# Patient Record
Sex: MALE | Race: WHITE | NOT HISPANIC OR LATINO | Employment: FULL TIME | URBAN - METROPOLITAN AREA
[De-identification: names, ages, dates, MRNs, and addresses within clinical notes are randomized per-mention and may not be internally consistent; named-entity substitution may affect disease eponyms.]

---

## 2020-03-11 ENCOUNTER — APPOINTMENT (OUTPATIENT)
Dept: GENERAL RADIOLOGY | Facility: HOSPITAL | Age: 50
End: 2020-03-11

## 2020-03-11 ENCOUNTER — HOSPITAL ENCOUNTER (EMERGENCY)
Facility: HOSPITAL | Age: 50
Discharge: HOME | End: 2020-03-11
Attending: EMERGENCY MEDICINE | Admitting: EMERGENCY MEDICINE

## 2020-03-11 VITALS
DIASTOLIC BLOOD PRESSURE: 85 MMHG | BODY MASS INDEX: 30.31 KG/M2 | HEIGHT: 68 IN | OXYGEN SATURATION: 100 % | SYSTOLIC BLOOD PRESSURE: 150 MMHG | RESPIRATION RATE: 20 BRPM | TEMPERATURE: 98.1 F | WEIGHT: 200 LBS | HEART RATE: 95 BPM

## 2020-03-11 DIAGNOSIS — K21.9 GASTROESOPHAGEAL REFLUX DISEASE WITHOUT ESOPHAGITIS: ICD-10-CM

## 2020-03-11 DIAGNOSIS — B34.9 VIRAL SYNDROME: Primary | ICD-10-CM

## 2020-03-11 LAB
ALBUMIN SERPL-MCNC: 4.9 G/DL (ref 3.5–5.2)
ALBUMIN/GLOB SERPL: 1.6 G/DL
ALP SERPL-CCNC: 101 U/L (ref 39–117)
ALT SERPL W P-5'-P-CCNC: 29 U/L (ref 1–41)
ANION GAP SERPL CALCULATED.3IONS-SCNC: 24 MMOL/L (ref 5–15)
AST SERPL-CCNC: 19 U/L (ref 1–40)
BASOPHILS # BLD AUTO: 0.05 10*3/MM3 (ref 0–0.2)
BASOPHILS NFR BLD AUTO: 0.4 % (ref 0–1.5)
BILIRUB SERPL-MCNC: 0.6 MG/DL (ref 0.2–1.2)
BUN BLD-MCNC: 22 MG/DL (ref 6–20)
BUN/CREAT SERPL: 24.4 (ref 7–25)
CALCIUM SPEC-SCNC: 10.2 MG/DL (ref 8.6–10.5)
CHLORIDE SERPL-SCNC: 97 MMOL/L (ref 98–107)
CO2 SERPL-SCNC: 14 MMOL/L (ref 22–29)
CREAT BLD-MCNC: 0.9 MG/DL (ref 0.76–1.27)
DEPRECATED RDW RBC AUTO: 38.3 FL (ref 37–54)
EOSINOPHIL # BLD AUTO: 0.03 10*3/MM3 (ref 0–0.4)
EOSINOPHIL NFR BLD AUTO: 0.3 % (ref 0.3–6.2)
ERYTHROCYTE [DISTWIDTH] IN BLOOD BY AUTOMATED COUNT: 11.8 % (ref 12.3–15.4)
FLUAV AG NPH QL: NEGATIVE
FLUBV AG NPH QL IA: NEGATIVE
GFR SERPL CREATININE-BSD FRML MDRD: 90 ML/MIN/1.73
GLOBULIN UR ELPH-MCNC: 3 GM/DL
GLUCOSE BLD-MCNC: 201 MG/DL (ref 65–99)
HCT VFR BLD AUTO: 42.9 % (ref 37.5–51)
HGB BLD-MCNC: 14.7 G/DL (ref 13–17.7)
HOLD SPECIMEN: NORMAL
HOLD SPECIMEN: NORMAL
IMM GRANULOCYTES # BLD AUTO: 0.06 10*3/MM3 (ref 0–0.05)
IMM GRANULOCYTES NFR BLD AUTO: 0.5 % (ref 0–0.5)
LIPASE SERPL-CCNC: 18 U/L (ref 13–60)
LYMPHOCYTES # BLD AUTO: 2.12 10*3/MM3 (ref 0.7–3.1)
LYMPHOCYTES NFR BLD AUTO: 18.5 % (ref 19.6–45.3)
MCH RBC QN AUTO: 30.8 PG (ref 26.6–33)
MCHC RBC AUTO-ENTMCNC: 34.3 G/DL (ref 31.5–35.7)
MCV RBC AUTO: 89.7 FL (ref 79–97)
MONOCYTES # BLD AUTO: 0.39 10*3/MM3 (ref 0.1–0.9)
MONOCYTES NFR BLD AUTO: 3.4 % (ref 5–12)
NEUTROPHILS # BLD AUTO: 8.81 10*3/MM3 (ref 1.7–7)
NEUTROPHILS NFR BLD AUTO: 76.9 % (ref 42.7–76)
NRBC BLD AUTO-RTO: 0 /100 WBC (ref 0–0.2)
NT-PROBNP SERPL-MCNC: 12.3 PG/ML (ref 5–450)
PLATELET # BLD AUTO: 285 10*3/MM3 (ref 140–450)
PMV BLD AUTO: 11 FL (ref 6–12)
POTASSIUM BLD-SCNC: 4.8 MMOL/L (ref 3.5–5.2)
PROT SERPL-MCNC: 7.9 G/DL (ref 6–8.5)
RBC # BLD AUTO: 4.78 10*6/MM3 (ref 4.14–5.8)
SODIUM BLD-SCNC: 135 MMOL/L (ref 136–145)
TROPONIN T SERPL-MCNC: <0.01 NG/ML (ref 0–0.03)
TROPONIN T SERPL-MCNC: <0.01 NG/ML (ref 0–0.03)
WBC NRBC COR # BLD: 11.46 10*3/MM3 (ref 3.4–10.8)
WHOLE BLOOD HOLD SPECIMEN: NORMAL
WHOLE BLOOD HOLD SPECIMEN: NORMAL

## 2020-03-11 PROCEDURE — 99285 EMERGENCY DEPT VISIT HI MDM: CPT

## 2020-03-11 PROCEDURE — 83690 ASSAY OF LIPASE: CPT | Performed by: EMERGENCY MEDICINE

## 2020-03-11 PROCEDURE — 71045 X-RAY EXAM CHEST 1 VIEW: CPT

## 2020-03-11 PROCEDURE — 83880 ASSAY OF NATRIURETIC PEPTIDE: CPT | Performed by: EMERGENCY MEDICINE

## 2020-03-11 PROCEDURE — 25010000002 ONDANSETRON PER 1 MG: Performed by: EMERGENCY MEDICINE

## 2020-03-11 PROCEDURE — 96374 THER/PROPH/DIAG INJ IV PUSH: CPT

## 2020-03-11 PROCEDURE — 25010000002 KETOROLAC TROMETHAMINE PER 15 MG: Performed by: PHYSICIAN ASSISTANT

## 2020-03-11 PROCEDURE — 85025 COMPLETE CBC W/AUTO DIFF WBC: CPT | Performed by: EMERGENCY MEDICINE

## 2020-03-11 PROCEDURE — 96375 TX/PRO/DX INJ NEW DRUG ADDON: CPT

## 2020-03-11 PROCEDURE — 93005 ELECTROCARDIOGRAM TRACING: CPT | Performed by: EMERGENCY MEDICINE

## 2020-03-11 PROCEDURE — 87804 INFLUENZA ASSAY W/OPTIC: CPT | Performed by: PHYSICIAN ASSISTANT

## 2020-03-11 PROCEDURE — 80053 COMPREHEN METABOLIC PANEL: CPT | Performed by: EMERGENCY MEDICINE

## 2020-03-11 PROCEDURE — 84484 ASSAY OF TROPONIN QUANT: CPT | Performed by: EMERGENCY MEDICINE

## 2020-03-11 RX ORDER — ONDANSETRON 4 MG/1
4 TABLET, ORALLY DISINTEGRATING ORAL EVERY 8 HOURS PRN
Qty: 12 TABLET | Refills: 0 | Status: SHIPPED | OUTPATIENT
Start: 2020-03-11

## 2020-03-11 RX ORDER — LIDOCAINE HYDROCHLORIDE 20 MG/ML
15 SOLUTION OROPHARYNGEAL ONCE
Status: COMPLETED | OUTPATIENT
Start: 2020-03-11 | End: 2020-03-11

## 2020-03-11 RX ORDER — INDOMETHACIN 25 MG/1
25 CAPSULE ORAL 3 TIMES DAILY PRN
COMMUNITY
End: 2020-03-11

## 2020-03-11 RX ORDER — FLUOXETINE 10 MG/1
10 CAPSULE ORAL DAILY
COMMUNITY

## 2020-03-11 RX ORDER — KETOROLAC TROMETHAMINE 30 MG/ML
30 INJECTION, SOLUTION INTRAMUSCULAR; INTRAVENOUS ONCE
Status: COMPLETED | OUTPATIENT
Start: 2020-03-11 | End: 2020-03-11

## 2020-03-11 RX ORDER — OMEPRAZOLE 20 MG/1
20 CAPSULE, DELAYED RELEASE ORAL DAILY
Qty: 14 CAPSULE | Refills: 0 | Status: SHIPPED | OUTPATIENT
Start: 2020-03-11

## 2020-03-11 RX ORDER — SUCRALFATE 1 G/1
1 TABLET ORAL 4 TIMES DAILY
Qty: 40 TABLET | Refills: 0 | Status: SHIPPED | OUTPATIENT
Start: 2020-03-11

## 2020-03-11 RX ORDER — SODIUM CHLORIDE 0.9 % (FLUSH) 0.9 %
10 SYRINGE (ML) INJECTION AS NEEDED
Status: DISCONTINUED | OUTPATIENT
Start: 2020-03-11 | End: 2020-03-11 | Stop reason: HOSPADM

## 2020-03-11 RX ORDER — ONDANSETRON 2 MG/ML
4 INJECTION INTRAMUSCULAR; INTRAVENOUS ONCE
Status: COMPLETED | OUTPATIENT
Start: 2020-03-11 | End: 2020-03-11

## 2020-03-11 RX ORDER — ALUMINA, MAGNESIA, AND SIMETHICONE 2400; 2400; 240 MG/30ML; MG/30ML; MG/30ML
15 SUSPENSION ORAL ONCE
Status: COMPLETED | OUTPATIENT
Start: 2020-03-11 | End: 2020-03-11

## 2020-03-11 RX ORDER — ASPIRIN 81 MG/1
324 TABLET, CHEWABLE ORAL ONCE
Status: COMPLETED | OUTPATIENT
Start: 2020-03-11 | End: 2020-03-11

## 2020-03-11 RX ORDER — LISINOPRIL 10 MG/1
10 TABLET ORAL DAILY
COMMUNITY

## 2020-03-11 RX ADMIN — LIDOCAINE HYDROCHLORIDE 15 ML: 20 SOLUTION ORAL; TOPICAL at 12:56

## 2020-03-11 RX ADMIN — KETOROLAC TROMETHAMINE 30 MG: 30 INJECTION, SOLUTION INTRAMUSCULAR; INTRAVENOUS at 12:54

## 2020-03-11 RX ADMIN — SODIUM CHLORIDE 1000 ML: 9 INJECTION, SOLUTION INTRAVENOUS at 10:11

## 2020-03-11 RX ADMIN — ONDANSETRON 4 MG: 2 INJECTION INTRAMUSCULAR; INTRAVENOUS at 12:53

## 2020-03-11 RX ADMIN — ASPIRIN 81 MG 324 MG: 81 TABLET ORAL at 10:12

## 2020-03-11 RX ADMIN — ALUMINUM HYDROXIDE, MAGNESIUM HYDROXIDE, AND DIMETHICONE 15 ML: 400; 400; 40 SUSPENSION ORAL at 12:56

## 2020-03-11 NOTE — ED PROVIDER NOTES
Subjective   Mr. Short is a 49-year-old male [facility] with multiple complaints.  He complains he is just generally not feeling well has had some nausea is having a lot of heartburn.  Patient reports still a bit of a cough and runny nose.  He has not traveled recently.  He has recently switched primary care doctors because he felt his blood sugars not being controlled.  Patient had no shortness of breath no chest pain associated with this.  Patient does smoke.  Patient has a mild headache.  Patient reports he had no diarrhea associated with this.  Has no abdominal pain associate with this other than the burning sensation in the epigastrium as discussed.      History provided by:  Patient and significant other   used: No    Vomiting   The primary symptoms include nausea, vomiting and myalgias. Primary symptoms do not include fever, fatigue, abdominal pain, melena, jaundice, hematochezia, dysuria, arthralgias or rash. The illness began 3 to 5 days ago. The onset was gradual.   The illness is also significant for chills. The illness does not include anorexia, dysphagia, bloating, constipation or itching. Significant associated medical issues include GERD and PUD. Associated medical issues do not include inflammatory bowel disease, gallstones, liver disease, alcohol abuse, gastric bypass, irritable bowel syndrome, hemorrhoids or diverticulitis.       Review of Systems   Constitutional: Positive for chills. Negative for fatigue and fever.   Respiratory: Negative for chest tightness, shortness of breath and wheezing.    Cardiovascular: Negative for chest pain and palpitations.   Gastrointestinal: Positive for nausea and vomiting. Negative for abdominal pain, anorexia, bloating, constipation, dysphagia, hematochezia, jaundice and melena.   Genitourinary: Negative for dysuria, hematuria and urgency.   Musculoskeletal: Positive for myalgias. Negative for arthralgias.   Skin: Negative  for itching and rash.   Psychiatric/Behavioral: Negative.    All other systems reviewed and are negative.      Past Medical History:   Diagnosis Date   • Depression    • Diabetes mellitus (CMS/HCC)        No Known Allergies    History reviewed. No pertinent surgical history.    History reviewed. No pertinent family history.    Social History     Socioeconomic History   • Marital status:      Spouse name: Not on file   • Number of children: Not on file   • Years of education: Not on file   • Highest education level: Not on file   Tobacco Use   • Smoking status: Never Smoker   • Smokeless tobacco: Never Used   Substance and Sexual Activity   • Alcohol use: Never     Frequency: Never   • Drug use: Never           Objective   Physical Exam   Constitutional: He is oriented to person, place, and time. He appears well-developed and well-nourished.   Warm pink dry appears uncomfortable but nontoxic.   HENT:   Head: Normocephalic and atraumatic.   Right Ear: External ear normal.   Left Ear: External ear normal.   Nose: Nose normal.   Mouth/Throat: Oropharynx is clear and moist.   Eyes: Pupils are equal, round, and reactive to light. Conjunctivae and EOM are normal. No scleral icterus.   Neck: Normal range of motion. No thyromegaly present.   Cardiovascular: Normal rate, regular rhythm and normal heart sounds.   Pulmonary/Chest: Effort normal and breath sounds normal. No respiratory distress. He has no wheezes. He has no rales. He exhibits no tenderness.   Abdominal: Soft. Bowel sounds are normal. He exhibits no distension. There is tenderness.   Musculoskeletal: Normal range of motion.   Lymphadenopathy:     He has no cervical adenopathy.   Neurological: He is alert and oriented to person, place, and time. He has normal reflexes. He displays normal reflexes. No cranial nerve deficit. Coordination normal.   Skin: Skin is warm and dry.   Psychiatric: He has a normal mood and affect. His behavior is normal. Judgment and  thought content normal.   Nursing note and vitals reviewed.      Procedures           ED Course  ED Course as of Mar 16 0755   Wed Mar 11, 2020   0940 Patient does not meet any of the CDC criteria for Covid    [ETIENNE]   1456 Mr. Altamirano and his significant other both states that they recently switched primary care doctor they have taken him off his insulin put him on oral medications.  They felt this may be part of the problem.  They like to have something for GERD due to he has terrible heartburn which is stimulating his of vomiting.  We discussed other treatments for GERD including not eating 3 hours prior to going to bed elevating the head of the bed.    [ETIENNE]      ED Course User Index  [ETIENNE] Woody Taylor PA            No results found for this or any previous visit (from the past 24 hour(s)).  Note: In addition to lab results from this visit, the labs listed above may include labs taken at another facility or during a different encounter within the last 24 hours. Please correlate lab times with ED admission and discharge times for further clarification of the services performed during this visit.    XR Chest 1 View   Final Result   No acute cardiopulmonary process.       D:  03/11/2020   E:  03/11/2020       This report was finalized on 3/11/2020 5:30 PM by Dr. Adilson Gil.            Vitals:    03/11/20 1400 03/11/20 1401 03/11/20 1430 03/11/20 1453   BP: 153/93  150/85    Pulse:   76 95   Resp:       Temp:       TempSrc:       SpO2:  96% 97% 100%   Weight:       Height:         Medications   aspirin chewable tablet 324 mg (324 mg Oral Given 3/11/20 1012)   sodium chloride 0.9 % bolus 1,000 mL (0 mL Intravenous Stopped 3/11/20 1253)   ketorolac (TORADOL) injection 30 mg (30 mg Intravenous Given 3/11/20 1254)   aluminum-magnesium hydroxide-simethicone (MAALOX MAX) 400-400-40 MG/5ML suspension 15 mL (15 mL Oral Given 3/11/20 1256)   Lidocaine Viscous HCl (XYLOCAINE) 2 % mouth solution 15 mL (15 mL Mouth/Throat  Given 3/11/20 1256)   ondansetron (ZOFRAN) injection 4 mg (4 mg Intravenous Given 3/11/20 1253)     ECG/EMG Results (last 24 hours)     Procedure Component Value Units Date/Time    ECG 12 Lead [[id number]] Collected:  03/11/20 0938     Updated:  03/11/20 1022    ECG 12 Lead [[id number]] Collected:  03/11/20 1317     Updated:  03/11/20 1327        ECG 12 Lead   Final Result   Test Reason : 2nd set   Blood Pressure : **/** mmHG   Vent. Rate : 074 BPM     Atrial Rate : 074 BPM      P-R Int : 134 ms          QRS Dur : 096 ms       QT Int : 390 ms       P-R-T Axes : 022 -23 024 degrees      QTc Int : 432 ms      Normal sinus rhythm   Anterior infarct , age undetermined   Abnormal ECG   When compared with ECG of 11-MAR-2020 09:38, (Unconfirmed)   No significant change was found   Confirmed by RED MADDOX ([id number]) on 3/11/2020 5:10:33 PM      Referred By:  CJ PEPPER           Confirmed By:RED MADDOX      ECG 12 Lead   Final Result   Test Reason : chest pain   Blood Pressure : **/** mmHG   Vent. Rate : 089 BPM     Atrial Rate : 089 BPM      P-R Int : 136 ms          QRS Dur : 094 ms       QT Int : 360 ms       P-R-T Axes : 043 -22 033 degrees      QTc Int : 438 ms      Normal sinus rhythm   No previous ECGs available   Confirmed by RED MADDOX ([id number]) on 3/11/2020 5:10:22 PM      Referred By:  VARSHA           Confirmed By:RED MADDOX                                            [facility]      Final diagnoses:   Viral syndrome   Gastroesophageal reflux disease without esophagitis            Woody Taylor PA  03/16/20 9999

## 2020-07-30 ENCOUNTER — HOSPITAL ENCOUNTER (OUTPATIENT)
Dept: GENERAL RADIOLOGY | Facility: HOSPITAL | Age: 50
Discharge: HOME | End: 2020-07-30
Admitting: STUDENT IN AN ORGANIZED HEALTH CARE EDUCATION/TRAINING PROGRAM

## 2020-07-30 ENCOUNTER — TRANSCRIBE ORDERS (OUTPATIENT)
Dept: GENERAL RADIOLOGY | Facility: HOSPITAL | Age: 50
End: 2020-07-30

## 2020-07-30 DIAGNOSIS — M79.641 HAND PAIN, RIGHT: Primary | ICD-10-CM

## 2020-07-30 PROCEDURE — 73140 X-RAY EXAM OF FINGER(S): CPT

## 2021-09-07 ENCOUNTER — TRANSCRIBE ORDERS (OUTPATIENT)
Dept: ADMINISTRATIVE | Facility: HOSPITAL | Age: 51
End: 2021-09-07

## 2021-09-07 DIAGNOSIS — G62.89 PERIPHERAL DEMYELINATING NEUROPATHY: Primary | ICD-10-CM

## 2021-09-18 ENCOUNTER — HOSPITAL ENCOUNTER (OUTPATIENT)
Dept: MRI IMAGING | Facility: HOSPITAL | Age: 51
Discharge: HOME | End: 2021-09-18
Admitting: STUDENT IN AN ORGANIZED HEALTH CARE EDUCATION/TRAINING PROGRAM

## 2021-09-18 DIAGNOSIS — G62.89 PERIPHERAL DEMYELINATING NEUROPATHY: ICD-10-CM

## 2021-09-18 PROCEDURE — A9577 INJ MULTIHANCE: HCPCS | Performed by: STUDENT IN AN ORGANIZED HEALTH CARE EDUCATION/TRAINING PROGRAM

## 2021-09-18 PROCEDURE — 70553 MRI BRAIN STEM W/O & W/DYE: CPT

## 2021-09-18 PROCEDURE — 0 GADOBENATE DIMEGLUMINE 529 MG/ML SOLUTION: Performed by: STUDENT IN AN ORGANIZED HEALTH CARE EDUCATION/TRAINING PROGRAM

## 2021-09-18 RX ADMIN — GADOBENATE DIMEGLUMINE 15 ML: 529 INJECTION, SOLUTION INTRAVENOUS at 08:32

## 2021-09-27 ENCOUNTER — LAB (OUTPATIENT)
Dept: LAB | Facility: HOSPITAL | Age: 51
End: 2021-09-27

## 2021-09-27 ENCOUNTER — OFFICE VISIT (OUTPATIENT)
Dept: NEUROLOGY | Facility: CLINIC | Age: 51
End: 2021-09-27

## 2021-09-27 VITALS
WEIGHT: 164.2 LBS | HEIGHT: 69 IN | TEMPERATURE: 99.3 F | DIASTOLIC BLOOD PRESSURE: 70 MMHG | BODY MASS INDEX: 24.32 KG/M2 | OXYGEN SATURATION: 96 % | SYSTOLIC BLOOD PRESSURE: 124 MMHG | HEART RATE: 80 BPM

## 2021-09-27 DIAGNOSIS — R26.9 GAIT DISORDER: ICD-10-CM

## 2021-09-27 DIAGNOSIS — G62.9 NEUROPATHY: ICD-10-CM

## 2021-09-27 DIAGNOSIS — R41.82 ALTERED MENTAL STATUS, UNSPECIFIED ALTERED MENTAL STATUS TYPE: ICD-10-CM

## 2021-09-27 DIAGNOSIS — Z79.899 HIGH RISK MEDICATION USE: ICD-10-CM

## 2021-09-27 DIAGNOSIS — G37.9 DEMYELINATING DISEASE (HCC): Primary | ICD-10-CM

## 2021-09-27 DIAGNOSIS — R90.89 ABNORMAL FINDING ON MRI OF BRAIN: ICD-10-CM

## 2021-09-27 DIAGNOSIS — F33.1 MODERATE EPISODE OF RECURRENT MAJOR DEPRESSIVE DISORDER (HCC): ICD-10-CM

## 2021-09-27 DIAGNOSIS — G37.9 DEMYELINATING DISEASE (HCC): ICD-10-CM

## 2021-09-27 LAB
AMPHET+METHAMPHET UR QL: NEGATIVE
AMPHETAMINES UR QL: NEGATIVE
BARBITURATES UR QL SCN: NEGATIVE
BENZODIAZ UR QL SCN: NEGATIVE
BUPRENORPHINE SERPL-MCNC: NEGATIVE NG/ML
CANNABINOIDS SERPL QL: POSITIVE
COCAINE UR QL: NEGATIVE
METHADONE UR QL SCN: NEGATIVE
OPIATES UR QL: NEGATIVE
OXYCODONE UR QL SCN: NEGATIVE
PCP UR QL SCN: NEGATIVE
PROPOXYPH UR QL: NEGATIVE
TRICYCLICS UR QL SCN: NEGATIVE

## 2021-09-27 PROCEDURE — 85613 RUSSELL VIPER VENOM DILUTED: CPT

## 2021-09-27 PROCEDURE — 83036 HEMOGLOBIN GLYCOSYLATED A1C: CPT

## 2021-09-27 PROCEDURE — 85610 PROTHROMBIN TIME: CPT

## 2021-09-27 PROCEDURE — 85025 COMPLETE CBC W/AUTO DIFF WBC: CPT

## 2021-09-27 PROCEDURE — 36415 COLL VENOUS BLD VENIPUNCTURE: CPT

## 2021-09-27 PROCEDURE — 81240 F2 GENE: CPT

## 2021-09-27 PROCEDURE — 86200 CCP ANTIBODY: CPT

## 2021-09-27 PROCEDURE — 80053 COMPREHEN METABOLIC PANEL: CPT

## 2021-09-27 PROCEDURE — 86255 FLUORESCENT ANTIBODY SCREEN: CPT

## 2021-09-27 PROCEDURE — 82746 ASSAY OF FOLIC ACID SERUM: CPT

## 2021-09-27 PROCEDURE — 85730 THROMBOPLASTIN TIME PARTIAL: CPT

## 2021-09-27 PROCEDURE — 86334 IMMUNOFIX E-PHORESIS SERUM: CPT

## 2021-09-27 PROCEDURE — 82784 ASSAY IGA/IGD/IGG/IGM EACH: CPT

## 2021-09-27 PROCEDURE — 84630 ASSAY OF ZINC: CPT

## 2021-09-27 PROCEDURE — 86617 LYME DISEASE ANTIBODY: CPT

## 2021-09-27 PROCEDURE — 81241 F5 GENE: CPT

## 2021-09-27 PROCEDURE — 85732 THROMBOPLASTIN TIME PARTIAL: CPT

## 2021-09-27 PROCEDURE — 99204 OFFICE O/P NEW MOD 45 MIN: CPT | Performed by: NURSE PRACTITIONER

## 2021-09-27 PROCEDURE — 83519 RIA NONANTIBODY: CPT

## 2021-09-27 PROCEDURE — 86235 NUCLEAR ANTIGEN ANTIBODY: CPT

## 2021-09-27 PROCEDURE — 82164 ANGIOTENSIN I ENZYME TEST: CPT

## 2021-09-27 PROCEDURE — 85598 HEXAGNAL PHOSPH PLTLT NEUTRL: CPT

## 2021-09-27 PROCEDURE — 86431 RHEUMATOID FACTOR QUANT: CPT

## 2021-09-27 PROCEDURE — 80306 DRUG TEST PRSMV INSTRMNT: CPT

## 2021-09-27 PROCEDURE — 86148 ANTI-PHOSPHOLIPID ANTIBODY: CPT

## 2021-09-27 PROCEDURE — 82607 VITAMIN B-12: CPT

## 2021-09-27 PROCEDURE — 85597 PHOSPHOLIPID PLTLT NEUTRALIZ: CPT

## 2021-09-27 PROCEDURE — 86140 C-REACTIVE PROTEIN: CPT

## 2021-09-27 PROCEDURE — 85652 RBC SED RATE AUTOMATED: CPT

## 2021-09-27 PROCEDURE — 85670 THROMBIN TIME PLASMA: CPT

## 2021-09-27 PROCEDURE — 83516 IMMUNOASSAY NONANTIBODY: CPT

## 2021-09-27 PROCEDURE — 86038 ANTINUCLEAR ANTIBODIES: CPT

## 2021-09-27 PROCEDURE — 86146 BETA-2 GLYCOPROTEIN ANTIBODY: CPT

## 2021-09-27 PROCEDURE — 82550 ASSAY OF CK (CPK): CPT

## 2021-09-27 PROCEDURE — 82306 VITAMIN D 25 HYDROXY: CPT

## 2021-09-27 PROCEDURE — 84443 ASSAY THYROID STIM HORMONE: CPT

## 2021-09-27 PROCEDURE — 86147 CARDIOLIPIN ANTIBODY EA IG: CPT

## 2021-09-27 RX ORDER — EMPAGLIFLOZIN 25 MG/1
TABLET, FILM COATED ORAL
COMMUNITY
Start: 2021-07-07

## 2021-09-27 RX ORDER — HYDROXYZINE HYDROCHLORIDE 10 MG/1
TABLET, FILM COATED ORAL
COMMUNITY
Start: 2021-09-16

## 2021-09-27 RX ORDER — ARIPIPRAZOLE 2 MG/1
TABLET ORAL
COMMUNITY
Start: 2021-09-16

## 2021-09-27 RX ORDER — GABAPENTIN 300 MG/1
CAPSULE ORAL
COMMUNITY
Start: 2021-09-07 | End: 2021-09-27

## 2021-09-27 RX ORDER — ROSUVASTATIN CALCIUM 10 MG/1
10 TABLET, COATED ORAL DAILY
COMMUNITY

## 2021-09-27 RX ORDER — PIOGLITAZONEHYDROCHLORIDE 30 MG/1
TABLET ORAL
COMMUNITY
Start: 2021-07-07

## 2021-09-27 RX ORDER — PREGABALIN 50 MG/1
50 CAPSULE ORAL 3 TIMES DAILY
Qty: 90 CAPSULE | Refills: 2 | Status: SHIPPED | OUTPATIENT
Start: 2021-09-27 | End: 2021-10-21 | Stop reason: DRUGHIGH

## 2021-09-28 LAB
25(OH)D3 SERPL-MCNC: 22 NG/ML (ref 30–100)
ALBUMIN SERPL-MCNC: 5.1 G/DL (ref 3.5–5.2)
ALBUMIN/GLOB SERPL: 1.9 G/DL
ALP SERPL-CCNC: 72 U/L (ref 39–117)
ALT SERPL W P-5'-P-CCNC: 13 U/L (ref 1–41)
ANION GAP SERPL CALCULATED.3IONS-SCNC: 14.8 MMOL/L (ref 5–15)
AST SERPL-CCNC: 19 U/L (ref 1–40)
BASOPHILS # BLD AUTO: 0.03 10*3/MM3 (ref 0–0.2)
BASOPHILS NFR BLD AUTO: 0.4 % (ref 0–1.5)
BILIRUB SERPL-MCNC: 0.4 MG/DL (ref 0–1.2)
BUN SERPL-MCNC: 14 MG/DL (ref 6–20)
BUN/CREAT SERPL: 20.6 (ref 7–25)
CALCIUM SPEC-SCNC: 9.8 MG/DL (ref 8.6–10.5)
CHLORIDE SERPL-SCNC: 101 MMOL/L (ref 98–107)
CHROMATIN AB SERPL-ACNC: 12.4 IU/ML (ref 0–14)
CK SERPL-CCNC: 115 U/L (ref 20–200)
CO2 SERPL-SCNC: 24.2 MMOL/L (ref 22–29)
CREAT SERPL-MCNC: 0.68 MG/DL (ref 0.76–1.27)
CRP SERPL-MCNC: 0.94 MG/DL (ref 0–0.5)
DEPRECATED RDW RBC AUTO: 42.9 FL (ref 37–54)
EOSINOPHIL # BLD AUTO: 0.03 10*3/MM3 (ref 0–0.4)
EOSINOPHIL NFR BLD AUTO: 0.4 % (ref 0.3–6.2)
ERYTHROCYTE [DISTWIDTH] IN BLOOD BY AUTOMATED COUNT: 12.7 % (ref 12.3–15.4)
ERYTHROCYTE [SEDIMENTATION RATE] IN BLOOD: 20 MM/HR (ref 0–15)
FOLATE SERPL-MCNC: 15.5 NG/ML (ref 4.78–24.2)
GFR SERPL CREATININE-BSD FRML MDRD: 124 ML/MIN/1.73
GLOBULIN UR ELPH-MCNC: 2.7 GM/DL
GLUCOSE SERPL-MCNC: 92 MG/DL (ref 65–99)
HBA1C MFR BLD: 7.95 % (ref 4.8–5.6)
HCT VFR BLD AUTO: 43.3 % (ref 37.5–51)
HGB BLD-MCNC: 14 G/DL (ref 13–17.7)
IMM GRANULOCYTES # BLD AUTO: 0.02 10*3/MM3 (ref 0–0.05)
IMM GRANULOCYTES NFR BLD AUTO: 0.2 % (ref 0–0.5)
LYMPHOCYTES # BLD AUTO: 1.87 10*3/MM3 (ref 0.7–3.1)
LYMPHOCYTES NFR BLD AUTO: 23 % (ref 19.6–45.3)
MCH RBC QN AUTO: 29.4 PG (ref 26.6–33)
MCHC RBC AUTO-ENTMCNC: 32.3 G/DL (ref 31.5–35.7)
MCV RBC AUTO: 91 FL (ref 79–97)
MONOCYTES # BLD AUTO: 0.4 10*3/MM3 (ref 0.1–0.9)
MONOCYTES NFR BLD AUTO: 4.9 % (ref 5–12)
NEUTROPHILS NFR BLD AUTO: 5.78 10*3/MM3 (ref 1.7–7)
NEUTROPHILS NFR BLD AUTO: 71.1 % (ref 42.7–76)
NRBC BLD AUTO-RTO: 0 /100 WBC (ref 0–0.2)
PLATELET # BLD AUTO: 251 10*3/MM3 (ref 140–450)
PMV BLD AUTO: 10.8 FL (ref 6–12)
POTASSIUM SERPL-SCNC: 3.9 MMOL/L (ref 3.5–5.2)
PROT SERPL-MCNC: 7.8 G/DL (ref 6–8.5)
RBC # BLD AUTO: 4.76 10*6/MM3 (ref 4.14–5.8)
SODIUM SERPL-SCNC: 140 MMOL/L (ref 136–145)
TSH SERPL DL<=0.05 MIU/L-ACNC: 1.73 UIU/ML (ref 0.27–4.2)
VIT B12 BLD-MCNC: 604 PG/ML (ref 211–946)
WBC # BLD AUTO: 8.13 10*3/MM3 (ref 3.4–10.8)

## 2021-09-29 LAB
ACE SERPL-CCNC: <15 U/L (ref 14–82)
ANA TITR SER IF: NEGATIVE {TITER}
CARDIOLIPIN IGA SER IA-ACNC: <9 APL U/ML (ref 0–11)
CARDIOLIPIN IGG SER IA-ACNC: <9 GPL U/ML (ref 0–14)
CARDIOLIPIN IGM SER IA-ACNC: <9 MPL U/ML (ref 0–12)
ENA SS-A AB SER-ACNC: <0.2 AI (ref 0–0.9)
ENA SS-B AB SER-ACNC: <0.2 AI (ref 0–0.9)
ENDOMYSIUM IGA SER QL: NEGATIVE
F5 GENE MUT ANL BLD/T: NORMAL
IGA SERPL-MCNC: 267 MG/DL (ref 90–386)
IGA SERPL-MCNC: 276 MG/DL (ref 90–386)
IGG SERPL-MCNC: 1302 MG/DL (ref 603–1613)
IGM SERPL-MCNC: 82 MG/DL (ref 20–172)
LABORATORY COMMENT REPORT: NORMAL
PROT PATTERN SERPL IFE-IMP: NORMAL
TTG IGA SER-ACNC: <2 U/ML (ref 0–3)

## 2021-09-30 LAB
CCP IGA+IGG SERPL IA-ACNC: 8 UNITS (ref 0–19)
MOG AB SER QL CBA IFA: NEGATIVE
PS IGG SER-ACNC: <10 UNITS (ref 0–30)
PS IGM SER-ACNC: <22 MPS IGM
RHEUMATOID FACT SERPL-ACNC: 12 IU/ML (ref 0–13.9)

## 2021-10-03 LAB — ZINC SERPL-MCNC: 86 UG/DL (ref 44–115)

## 2021-10-04 LAB — AQP4 H2O CHANNEL IGG SERPL QL: NEGATIVE

## 2021-10-05 LAB
B BURGDOR IGG PATRN SER IB-IMP: NEGATIVE
B BURGDOR IGM PATRN SER IB-IMP: NEGATIVE
B BURGDOR18KD IGG SER QL IB: NORMAL
B BURGDOR23KD IGG SER QL IB: NORMAL
B BURGDOR23KD IGM SER QL IB: NORMAL
B BURGDOR28KD IGG SER QL IB: NORMAL
B BURGDOR30KD IGG SER QL IB: NORMAL
B BURGDOR39KD IGG SER QL IB: NORMAL
B BURGDOR39KD IGM SER QL IB: NORMAL
B BURGDOR41KD IGG SER QL IB: NORMAL
B BURGDOR41KD IGM SER QL IB: NORMAL
B BURGDOR45KD IGG SER QL IB: NORMAL
B BURGDOR58KD IGG SER QL IB: NORMAL
B BURGDOR66KD IGG SER QL IB: NORMAL
B BURGDOR93KD IGG SER QL IB: NORMAL
FACTOR II, DNA ANALYSIS: NORMAL

## 2021-10-08 LAB
APTT HEX PL PPP: 0 SEC
APTT IMM NP PPP: NORMAL S
APTT PPP 1:1 SALINE: NORMAL S
APTT PPP: 25.7 SEC
B2 GLYCOPROT1 IGA SER-ACNC: <10 SAU
B2 GLYCOPROT1 IGG SER-ACNC: <10 SGU
B2 GLYCOPROT1 IGM SER-ACNC: <10 SMU
CARDIOLIPIN IGG SER IA-ACNC: <10 GPL
CARDIOLIPIN IGM SER IA-ACNC: <10 MPL
CONFIRM APTT: 0 SEC
CONFIRM DRVVT: NORMAL S
DRVVT SCREEN TO CONFIRM RATIO: NORMAL {RATIO}
INR PPP: 1 RATIO
LABORATORY COMMENT REPORT: NORMAL
PROTHROMBIN TIME: 10.5 SEC
SCREEN DRVVT: 35 SEC
THROMBIN TIME: 18 SEC

## 2021-10-18 ENCOUNTER — HOSPITAL ENCOUNTER (OUTPATIENT)
Dept: MRI IMAGING | Facility: HOSPITAL | Age: 51
Discharge: HOME | End: 2021-10-18
Admitting: NURSE PRACTITIONER

## 2021-10-18 DIAGNOSIS — G37.9 DEMYELINATING DISEASE (HCC): ICD-10-CM

## 2021-10-18 PROCEDURE — 72156 MRI NECK SPINE W/O & W/DYE: CPT

## 2021-10-18 PROCEDURE — 0 GADOBENATE DIMEGLUMINE 529 MG/ML SOLUTION: Performed by: NURSE PRACTITIONER

## 2021-10-18 PROCEDURE — A9577 INJ MULTIHANCE: HCPCS | Performed by: NURSE PRACTITIONER

## 2021-10-18 RX ADMIN — GADOBENATE DIMEGLUMINE 15 ML: 529 INJECTION, SOLUTION INTRAVENOUS at 20:43

## 2021-10-21 ENCOUNTER — LAB (OUTPATIENT)
Dept: LAB | Facility: HOSPITAL | Age: 51
End: 2021-10-21

## 2021-10-21 ENCOUNTER — APPOINTMENT (OUTPATIENT)
Dept: LAB | Facility: HOSPITAL | Age: 51
End: 2021-10-21

## 2021-10-21 ENCOUNTER — OFFICE VISIT (OUTPATIENT)
Dept: NEUROLOGY | Facility: CLINIC | Age: 51
End: 2021-10-21

## 2021-10-21 VITALS
HEART RATE: 92 BPM | BODY MASS INDEX: 24.44 KG/M2 | TEMPERATURE: 98 F | WEIGHT: 165 LBS | OXYGEN SATURATION: 98 % | RESPIRATION RATE: 16 BRPM | DIASTOLIC BLOOD PRESSURE: 70 MMHG | HEIGHT: 69 IN | SYSTOLIC BLOOD PRESSURE: 116 MMHG

## 2021-10-21 DIAGNOSIS — F33.1 MODERATE EPISODE OF RECURRENT MAJOR DEPRESSIVE DISORDER (HCC): ICD-10-CM

## 2021-10-21 DIAGNOSIS — R41.82 ALTERED MENTAL STATUS, UNSPECIFIED ALTERED MENTAL STATUS TYPE: ICD-10-CM

## 2021-10-21 DIAGNOSIS — G62.9 NEUROPATHY: ICD-10-CM

## 2021-10-21 DIAGNOSIS — G62.9 NEUROPATHY: Primary | ICD-10-CM

## 2021-10-21 PROCEDURE — 82300 ASSAY OF CADMIUM: CPT

## 2021-10-21 PROCEDURE — 83655 ASSAY OF LEAD: CPT

## 2021-10-21 PROCEDURE — 82175 ASSAY OF ARSENIC: CPT

## 2021-10-21 PROCEDURE — 82570 ASSAY OF URINE CREATININE: CPT

## 2021-10-21 PROCEDURE — 83825 ASSAY OF MERCURY: CPT

## 2021-10-21 PROCEDURE — 99214 OFFICE O/P EST MOD 30 MIN: CPT | Performed by: NURSE PRACTITIONER

## 2021-10-21 RX ORDER — VITAMIN B COMPLEX
TABLET ORAL
COMMUNITY
Start: 2021-10-05

## 2021-10-21 RX ORDER — FLUOXETINE HYDROCHLORIDE 20 MG/1
CAPSULE ORAL
COMMUNITY
Start: 2021-10-12

## 2021-10-21 RX ORDER — DULAGLUTIDE 0.75 MG/.5ML
INJECTION, SOLUTION SUBCUTANEOUS
COMMUNITY
Start: 2021-10-06

## 2021-10-21 RX ORDER — PREGABALIN 100 MG/1
100 CAPSULE ORAL 3 TIMES DAILY
Qty: 90 CAPSULE | Refills: 5 | Status: SHIPPED | OUTPATIENT
Start: 2021-10-21

## 2021-10-21 NOTE — PROGRESS NOTES
Neuro Office Visit      Encounter Date: 10/21/2021   Patient Name: Alfa Altamirano  : 1970   MRN: [id number]     Chief Complaint:    Chief Complaint   Patient presents with   • Demyelinating disease       History of Present Illness: Alfa Altamirano is a 50 y.o. male who is here today in Neurology with his wife for neuropathy, altered mental status, MDD    Last visit 21 w me-check labs, wean GBP, start Lyrica    Urine heavy metals-ordered, not performed  Labs: NMO-neg, MOG neg, MG-pending  CRP elevated 0.94, sed rate 20  Vit D low 22  A1c 7.95    MRI Brain With & Without Contrast (2021 08:44)  IMPRESSION:  Abnormal contrast enhancement with 2-3 small areas of  increased signal and subcortical white matter on the left suggesting  chronic small vessel ischemic change. No acute intracranial abnormality  identified.       MRI Cervical Spine With & Without Contrast (10/18/2021 20:43)    IMPRESSION:  1. No evidence for demyelinating disease within the cervical cord.     2. Progression of disease in regards to spondylitic changes at the C6-C7  level where there is right lateralizing disc osteophyte complex  contacting and indenting the right hemicord with at least  mild-to-moderate right neuroforaminal stenosis.      Neuropathy  Feel like bugs are crawling on him and biting him. Improved but still present on Lyrica    Altered mental status  Continues to suffer from short term memory problems. Can't remember tasks at work.  MMSE 26/30.    MDD  Moods are not controlled. Recently diagnosed as bipolar. Frustrated and irritable. Anxiety meds make him sleepy. Not sleeping well at night due to mind racing. Seeing psychiatry. Denies suicidal thoughts today.  Subjective      Past Medical History:   Past Medical History:   Diagnosis Date   • Depression    • Diabetes mellitus (HCC)        Past Surgical History: History reviewed. No pertinent surgical history.    Family History:   Family History   Problem  Relation Age of Onset   • Breast cancer Mother    • Alzheimer's disease Father    • Coronary artery disease Father    • Diabetes Sister    • Depression Daughter        Social History:   Social History     Socioeconomic History   • Marital status:    Tobacco Use   • Smoking status: Former Smoker     Quit date:      Years since quittin.8   • Smokeless tobacco: Never Used   Vaping Use   • Vaping Use: Former   Substance and Sexual Activity   • Alcohol use: Never   • Drug use: Never   • Sexual activity: Defer       Medications:     Current Outpatient Medications:   •  Ertugliflozin L-PyroglutamicAc (STEGLATRO) 5 MG tablet, Take 5 mg by mouth Every Morning., Disp: , Rfl:   •  FLUoxetine (PROzac) 20 MG capsule, , Disp: , Rfl:   •  hydrOXYzine (ATARAX) 10 MG tablet, , Disp: , Rfl:   •  Jardiance 25 MG tablet tablet, , Disp: , Rfl:   •  lisinopril (PRINIVIL,ZESTRIL) 10 MG tablet, Take 10 mg by mouth Daily., Disp: , Rfl:   •  pioglitazone (ACTOS) 30 MG tablet, , Disp: , Rfl:   •  rosuvastatin (CRESTOR) 10 MG tablet, Take 10 mg by mouth Daily., Disp: , Rfl:   •  SITagliptin (JANUVIA) 100 MG tablet, Take 100 mg by mouth Daily., Disp: , Rfl:   •  Trulicity 0.75 MG/0.5ML solution pen-injector, , Disp: , Rfl:   •  Vitamin D 25 MCG (1000 UT) tablet, , Disp: , Rfl:   •  ARIPiprazole (ABILIFY) 2 MG tablet, , Disp: , Rfl:   •  FLUoxetine (PROzac) 10 MG capsule, Take 10 mg by mouth Daily., Disp: , Rfl:   •  omeprazole (priLOSEC) 20 MG capsule, Take 1 capsule by mouth Daily., Disp: 14 capsule, Rfl: 0  •  ondansetron ODT (ZOFRAN-ODT) 4 MG disintegrating tablet, Take 1 tablet by mouth Every 8 (Eight) Hours As Needed for Nausea., Disp: 12 tablet, Rfl: 0  •  pregabalin (LYRICA) 100 MG capsule, Take 1 capsule by mouth 3 (Three) Times a Day., Disp: 90 capsule, Rfl: 5  •  sucralfate (CARAFATE) 1 g tablet, Take 1 tablet by mouth 4 (Four) Times a Day., Disp: 40 tablet, Rfl: 0    Allergies:   No Known Allergies    PHQ-9 Total  "Score:     STEADI Fall Risk Assessment has not been completed.    Objective     Physical Exam:   Physical Exam  Eyes:      Pupils: Pupils are equal, round, and reactive to light.   Neurological:      Mental Status: He is oriented to person, place, and time.      Gait: Gait is intact.   Psychiatric:         Speech: Speech normal.         Neurologic Exam     Mental Status   Oriented to person, place, and time.   Follows 3 step commands.   Attention: normal. Concentration: normal.   Speech: speech is normal   Level of consciousness: alert  Knowledge: consistent with education.   Normal comprehension.   Appears irritable with poor eye contact. Can't sit still in the chair.     Cranial Nerves     CN III, IV, VI   Pupils are equal, round, and reactive to light.  Right pupil: Accommodation: intact.   Left pupil: Accommodation: intact.   CN III: no CN III palsy  CN VI: no CN VI palsy  Nystagmus: none   Diplopia: none  Upgaze: normal  Downgaze: normal  Conjugate gaze: present    CN VII   Facial expression full, symmetric.     CN VIII   Hearing: intact    CN XII   CN XII normal.     Motor Exam   Muscle bulk: normal  Overall muscle tone: normal    Strength   Right biceps: 5/5  Left biceps: 5/5  Right triceps: 5/5  Left triceps: 5/5  Right interossei: 5/5  Left interossei: 5/5  Right quadriceps: 5/5  Left quadriceps: 5/5  Right anterior tibial: 5/5  Left anterior tibial: 5/5  Right posterior tibial: 5/5  Left posterior tibial: 5/5    Sensory Exam   Light touch normal.     Gait, Coordination, and Reflexes     Gait  Gait: normal    Tremor   Resting tremor: absent  Action tremor: absent       Vital Signs:   Vitals:    10/21/21 1303   BP: 116/70   Pulse: 92   Resp: 16   Temp: 98 °F (36.7 °C)   SpO2: 98%   Weight: 74.8 kg (165 lb)   Height: 175.3 cm (69\")     Body mass index is 24.37 kg/m².     Results:   Imaging:   MRI Brain With & Without Contrast    Result Date: 9/21/2021  Abnormal contrast enhancement with 2-3 small areas of " increased signal and subcortical white matter on the left suggesting chronic small vessel ischemic change. No acute intracranial abnormality identified.   D:  09/20/2021 E:  09/20/2021  This report was finalized on 9/21/2021 4:57 PM by Dr. Neetu Gutierrez MD.      MRI Cervical Spine With & Without Contrast    Result Date: 10/19/2021  1. No evidence for demyelinating disease within the cervical cord.  2. Progression of disease in regards to spondylitic changes at the C6-C7 level where there is right lateralizing disc osteophyte complex contacting and indenting the right hemicord with at least mild-to-moderate right neuroforaminal stenosis.   D:  10/19/2021 E:  10/19/2021  This report was finalized on 10/19/2021 4:24 PM by Dr. Adilson Gil.           Assessment / Plan      Assessment/Plan:   Diagnoses and all orders for this visit:    1. Neuropathy (Primary)  -     Heavy Metals Profile II, Urine - Urine, Clean Catch; Future  -     EEG (Hospital Performed); Future  -     pregabalin (LYRICA) 100 MG capsule; Take 1 capsule by mouth 3 (Three) Times a Day.  Dispense: 90 capsule; Refill: 5    2. Altered mental status, unspecified altered mental status type  -     EEG (Hospital Performed); Future  -     pregabalin (LYRICA) 100 MG capsule; Take 1 capsule by mouth 3 (Three) Times a Day.  Dispense: 90 capsule; Refill: 5    3. Moderate episode of recurrent major depressive disorder (HCC)  -     pregabalin (LYRICA) 100 MG capsule; Take 1 capsule by mouth 3 (Three) Times a Day.  Dispense: 90 capsule; Refill: 5     Patient is clearly upset and frustrated that we did not find an etiology for his symptoms. We discussed how mood disorder can impair attention and concentration.  I suggested an eval at the [facility] today for possible inpatient treatment and he declined.    Patient Education:     Reviewed medications, potential side effects and signs and symptoms to report. Discussed risk versus benefits of treatment plan with patient  and/or family-including medications, labs and radiology that may be ordered. Addressed questions and concerns during visit. Patient and/or family verbalized understanding and agree with plan. Instructed to call the office with any questions and report to ER with any life-threatening symptoms.     Follow Up:   Return in about 6 weeks (around 12/2/2021), or Dr Dozier, for Recheck.    During this visit the following were done:  Labs Reviewed [x]    Labs Ordered [x]    Radiology Reports Reviewed [x]    Radiology Ordered []    PCP Records Reviewed []    Referring Provider Records Reviewed []    ER Records Reviewed []    Hospital Records Reviewed []    History Obtained From Family []    Radiology Images Reviewed []    Other Reviewed [x]    Records Requested []      Braden Santizo, DNP, APRN

## 2021-10-27 LAB
ARSENIC 24H UR-MCNC: NORMAL UG/L (ref 0–9)
ARSENIC/CREAT UR: NORMAL
CADMIUM 24H UR-MCNC: NORMAL UG/L
CREAT UR-MCNC: 0.49 G/L (ref 0.3–3)
LEAD 24H UR-MCNC: NORMAL UG/L (ref 0–49)
MERCURY 24H UR-MCNC: NORMAL UG/L (ref 0–19)

## 2021-10-29 ENCOUNTER — PATIENT MESSAGE (OUTPATIENT)
Dept: NEUROLOGY | Facility: CLINIC | Age: 51
End: 2021-10-29

## 2021-11-03 LAB
ACHR BIND AB SER-SCNC: <0.03 NMOL/L (ref 0–0.24)
ACHR BLOCK AB SER-ACNC: 22 % (ref 0–25)
ACHR MOD AB/ACHR TOTAL SFR SER: NORMAL %
REFLEX INFORMATION: NORMAL
STRIA MUS AB TITR SER IF: NEGATIVE {TITER}